# Patient Record
Sex: FEMALE | HISPANIC OR LATINO
[De-identification: names, ages, dates, MRNs, and addresses within clinical notes are randomized per-mention and may not be internally consistent; named-entity substitution may affect disease eponyms.]

---

## 2022-04-11 ENCOUNTER — APPOINTMENT (OUTPATIENT)
Dept: ENDOCRINOLOGY | Facility: CLINIC | Age: 52
End: 2022-04-11
Payer: MEDICAID

## 2022-04-11 VITALS
WEIGHT: 172 LBS | BODY MASS INDEX: 27.64 KG/M2 | HEIGHT: 66 IN | DIASTOLIC BLOOD PRESSURE: 70 MMHG | SYSTOLIC BLOOD PRESSURE: 110 MMHG | HEART RATE: 75 BPM

## 2022-04-11 PROBLEM — Z00.00 ENCOUNTER FOR PREVENTIVE HEALTH EXAMINATION: Status: ACTIVE | Noted: 2022-04-11

## 2022-04-11 PROCEDURE — 99205 OFFICE O/P NEW HI 60 MIN: CPT

## 2022-04-11 NOTE — ASSESSMENT
[Methimazole Therapy/PTU Therapy] : Risks and benefits of methimazole therapy/PTU therapy were discussed with the patient, including rash, liver dysfunction, and agranulocytosis. Patient was instructed to call the office for flu-like symptoms (e.g. fever and sore-throat) [FreeTextEntry1] : Now appears euthyroid. WE discussed all options for reestablishing euthyroid state, including TT vs. FIGUEROA. vs. long term HENNESSY use, and their respective r/b. After discussion, she is interested on definitive Rx w/ FIGUEROA. We will continue BB and stop HENNESSY at this time, and perform NM uptake exam + treatment in 2-4 weeks. Verbalized understanding and agrees with treatment plan, will contact MD and seek emergency medical care if condition changes, we reviewed the sign/symptoms or worsening thyrotoxicosis and thyroid storm. We will likely restart a lower dose of HENNESSY a week after FIGUEROA treatment while I-131 takes effect.\par \par 2) Weight gain:  complicated bypre DM2. Discussed medical  strategies. Pt would like to try lifestyle modifications and metformin therapy at this time. Reassess on the NV for at least ~5% TBW loss.\par \par 3) Essential HTN: Pt is at goal BP and on an ACE inh. Reassess microalbumin prior to the NV.\par  \par 4) Dyslipidemia: Pt is on a highintensity statin. Atorvastatin 80 mg QDaily. REassess lipids on the next visit. LDL target <100.\par \par

## 2022-04-11 NOTE — DATA REVIEWED
[FreeTextEntry1] : 12/2021 TSH <0.01 fT4 1.8 TT3 200\par US of the thyroid mildly hypervasc parenchyma, no nodules.

## 2022-04-11 NOTE — HISTORY OF PRESENT ILLNESS
[FreeTextEntry1] : 50 y/o F w/ Hx of preDM2, HTN, HLD \par here for initial evaluation and management of thyroid issues\par generally feels well and endorses no acute complaints.\par reports cc of tachycardia, fatigue and hair loss, started ~6 months ago. no precipitating factors, gradually worsened over time, some improvement after metoprolol iniitiation. reports family hx of graves (daughter, sister). She otherwise denies any f/c, CP, SOB, palpitations, tremors, depressed mood, anxiety, palpitations, n/v, stool/urinary abn, skin/weight changes, heat/cold intolerance, HAs, breast/nipple changes, polyuria/polydipsia/nocturia or other complaints.\par she again denies any dysphagia, hoarseness, neck tenderness or new palpable masses. she again denies any family history of thyroid disorders or personal exposure to ionizing radiation.\par \par

## 2022-06-20 ENCOUNTER — APPOINTMENT (OUTPATIENT)
Dept: ENDOCRINOLOGY | Facility: CLINIC | Age: 52
End: 2022-06-20
Payer: MEDICAID

## 2022-06-20 VITALS
HEIGHT: 66 IN | SYSTOLIC BLOOD PRESSURE: 120 MMHG | HEART RATE: 64 BPM | WEIGHT: 178 LBS | DIASTOLIC BLOOD PRESSURE: 70 MMHG | BODY MASS INDEX: 28.61 KG/M2

## 2022-06-20 PROCEDURE — 99215 OFFICE O/P EST HI 40 MIN: CPT

## 2022-06-20 NOTE — HISTORY OF PRESENT ILLNESS
[FreeTextEntry1] : 52 y/o F w/ Hx of preDM2, HTN, HLD \par initial evaluation and management of thyroid issues\par generally feels well and endorses no acute complaints.\par reports cc of tachycardia, fatigue and hair loss, started ~6 months ago. no precipitating factors, gradually worsened over time, some improvement after metoprolol iniitiation. reports family hx of graves (daughter, sister). \par \par 6/2022 Here for /fu, generally feels well and endorses no acute complaints. No interval events since LV. Today reports cessation of palpitations, concerned about weight gain. labs w/ TSH at 0.02, fT4 1 and TT3 wnl at 100. TSH recep ab at5 2.3, TPO Ab at 310 \par She otherwise denies any f/c, CP, SOB, palpitations, tremors, depressed mood, anxiety, palpitations, n/v, stool/urinary abn, skin/weight changes, heat/cold intolerance, HAs, breast/nipple changes, polyuria/polydipsia/nocturia or other complaints.\par she again denies any dysphagia, hoarseness, neck tenderness or new palpable masses. she again denies any family history of thyroid disorders or personal exposure to ionizing radiation.\par \par

## 2022-06-20 NOTE — ASSESSMENT
[FreeTextEntry1] : Now appears euthyroid. WE discussed all options for reestablishing euthyroid state, including TT vs. FIGUEROA. vs. long term HENNESSY use, and their respective r/b. After discussion, she is interested on definitive Rx w/ FIGUEROA. We will continue BB and stop HENNESSY at this time, and perform NM uptake exam + treatment in 2-4 weeks. Verbalized understanding and agrees with treatment plan, will contact MD and seek emergency medical care if condition changes, we reviewed the sign/symptoms or worsening thyrotoxicosis and thyroid storm. We will likely restart a lower dose of HENNESSY a week after FIGUEROA treatment while I-131 takes effect.\par \par 2) Weight gain:  complicated by pre DM2. Discussed medical  strategies. Pt would like to try lifestyle modifications and metformin therapy at this time. Reassess on the NV for at least ~5% TBW loss.\par \par 3) Essential HTN: Pt is at goal BP and on an ACE inh. Reassess microalbumin prior to the NV.\par  \par 4) Dyslipidemia: Pt is on a highintensity statin. Atorvastatin 80 mg QDaily. REassess lipids on the next visit. LDL target <100.\par \par  [Methimazole Therapy/PTU Therapy] : Risks and benefits of methimazole therapy/PTU therapy were discussed with the patient, including rash, liver dysfunction, and agranulocytosis. Patient was instructed to call the office for flu-like symptoms (e.g. fever and sore-throat)

## 2022-09-26 ENCOUNTER — APPOINTMENT (OUTPATIENT)
Dept: ENDOCRINOLOGY | Facility: CLINIC | Age: 52
End: 2022-09-26

## 2022-09-26 VITALS
HEART RATE: 80 BPM | BODY MASS INDEX: 28.77 KG/M2 | DIASTOLIC BLOOD PRESSURE: 60 MMHG | WEIGHT: 179 LBS | SYSTOLIC BLOOD PRESSURE: 100 MMHG | HEIGHT: 66 IN

## 2022-09-26 PROCEDURE — 99215 OFFICE O/P EST HI 40 MIN: CPT

## 2022-09-26 RX ORDER — METFORMIN ER 500 MG 500 MG/1
500 TABLET ORAL DAILY
Qty: 180 | Refills: 1 | Status: ACTIVE | COMMUNITY
Start: 2022-09-26 | End: 1900-01-01

## 2022-09-26 RX ORDER — METHIMAZOLE 5 MG/1
5 TABLET ORAL DAILY
Qty: 90 | Refills: 1 | Status: ACTIVE | COMMUNITY
Start: 2022-04-11 | End: 1900-01-01

## 2022-09-26 RX ORDER — ATORVASTATIN CALCIUM 80 MG/1
80 TABLET, FILM COATED ORAL
Qty: 90 | Refills: 3 | Status: ACTIVE | COMMUNITY
Start: 2022-09-26 | End: 1900-01-01

## 2022-09-26 NOTE — HISTORY OF PRESENT ILLNESS
[FreeTextEntry1] : 51 y/o F w/ Hx of preDM2, HTN, HLD \par initial evaluation and management of thyroid issues\par generally feels well and endorses no acute complaints.\par reports cc of tachycardia, fatigue and hair loss, started ~6 months ago. no precipitating factors, gradually worsened over time, some improvement after metoprolol iniitiation. reports family hx of graves (daughter, sister). \par \par 9/2022 Here for /fu, generally feels well and endorses no acute complaints. No interval events since LV. Today reports cessation of palpitations, resolved weight gain. labs w/ , fT4 1 and TT3 wnl at 100. TSH recep ab at5 2.3, TPO Ab at 310 \par She otherwise denies any f/c, CP, SOB, palpitations, tremors, depressed mood, anxiety, palpitations, n/v, stool/urinary abn, skin/weight changes, heat/cold intolerance, HAs, breast/nipple changes, polyuria/polydipsia/nocturia or other complaints.\par she again denies any dysphagia, hoarseness, neck tenderness or new palpable masses. she again denies any family history of thyroid disorders or personal exposure to ionizing radiation.\par \par

## 2022-09-26 NOTE — ASSESSMENT
[FreeTextEntry1] : Now appears euthyroid. WE discussed all options for reestablishing euthyroid state, including TT vs. FIGUEROA. vs. long term HENNESSY use, and their respective r/b. After discussion, she is interested on  HENNESSY at this time, reduce to 5 mg Mon-Fri. Verbalized understanding and agrees with treatment plan, will contact MD and seek emergency medical care if condition changes, we reviewed the sign/symptoms or worsening thyrotoxicosis and thyroid storm. \par \par 2) Weight gain:  complicated by pre DM2. Discussed medical  strategies. Pt would like to try lifestyle modifications and metformin therapy at this time. Reassess on the NV for at least ~5% TBW loss.\par \par 3) Essential HTN: Pt is at goal BP and on an ACE inh. Reassess microalbumin prior to the NV.\par  \par 4) Dyslipidemia: Pt is on a highintensity statin. Atorvastatin 80 mg QDaily. REassess lipids on the next visit. LDL target <100.\par \par  [Long Term Vascular Complications] : long term vascular complications of diabetes [Carbohydrate Consistent Diet] : carbohydrate consistent diet [Importance of Diet and Exercise] : importance of diet and exercise to improve glycemic control, achieve weight loss and improve cardiovascular health [Weight Loss] : weight loss [Methimazole Therapy/PTU Therapy] : Risks and benefits of methimazole therapy/PTU therapy were discussed with the patient, including rash, liver dysfunction, and agranulocytosis. Patient was instructed to call the office for flu-like symptoms (e.g. fever and sore-throat)

## 2023-01-23 ENCOUNTER — APPOINTMENT (OUTPATIENT)
Dept: ENDOCRINOLOGY | Facility: CLINIC | Age: 53
End: 2023-01-23
Payer: MEDICAID

## 2023-01-23 VITALS
SYSTOLIC BLOOD PRESSURE: 100 MMHG | HEIGHT: 66 IN | DIASTOLIC BLOOD PRESSURE: 60 MMHG | BODY MASS INDEX: 28.77 KG/M2 | WEIGHT: 179 LBS | HEART RATE: 70 BPM

## 2023-01-23 DIAGNOSIS — R73.03 PREDIABETES.: ICD-10-CM

## 2023-01-23 DIAGNOSIS — E05.00 THYROTOXICOSIS WITH DIFFUSE GOITER W/OUT THYROTOXIC CRISIS OR STORM: ICD-10-CM

## 2023-01-23 DIAGNOSIS — I10 ESSENTIAL (PRIMARY) HYPERTENSION: ICD-10-CM

## 2023-01-23 DIAGNOSIS — E78.5 HYPERLIPIDEMIA, UNSPECIFIED: ICD-10-CM

## 2023-01-23 PROCEDURE — 99215 OFFICE O/P EST HI 40 MIN: CPT

## 2023-01-23 NOTE — HISTORY OF PRESENT ILLNESS
[FreeTextEntry1] : 51 y/o F w/ Hx of preDM2, HTN, HLD \par initial evaluation and management of thyroid issues\par generally feels well and endorses no acute complaints.\par reports cc of tachycardia, fatigue and hair loss, started ~6 months ago. no precipitating factors, gradually worsened over time, some improvement after metoprolol iniitiation. reports family hx of graves (daughter, sister). \par \par 1/2023 Here for /fu, generally feels well and endorses no acute complaints. No interval events since LV. Today reports cessation of palpitations, resolved weight gain. labs w/ , fT4 1 and TT3 wnl at 100. TSH recep ab at5 2.3, TPO Ab at 310 \par She otherwise denies any f/c, CP, SOB, palpitations, tremors, depressed mood, anxiety, palpitations, n/v, stool/urinary abn, skin/weight changes, heat/cold intolerance, HAs, breast/nipple changes, polyuria/polydipsia/nocturia or other complaints.\par she again denies any dysphagia, hoarseness, neck tenderness or new palpable masses. she again denies any family history of thyroid disorders or personal exposure to ionizing radiation.\par \par

## 2023-01-23 NOTE — ASSESSMENT
[Long Term Vascular Complications] : long term vascular complications of diabetes [Carbohydrate Consistent Diet] : carbohydrate consistent diet [Importance of Diet and Exercise] : importance of diet and exercise to improve glycemic control, achieve weight loss and improve cardiovascular health [Weight Loss] : weight loss [Methimazole Therapy/PTU Therapy] : Risks and benefits of methimazole therapy/PTU therapy were discussed with the patient, including rash, liver dysfunction, and agranulocytosis. Patient was instructed to call the office for flu-like symptoms (e.g. fever and sore-throat) [FreeTextEntry1] : Now appears euthyroid. WE discussed all options for reestablishing euthyroid state, including TT vs. FIGUEROA. vs. long term HENNESSY use, and their respective r/b. After discussion, she is interested on  HENNESSY at this time, reduce to 5 mg Mon-Fri. Verbalized understanding and agrees with treatment plan, will contact MD and seek emergency medical care if condition changes, we reviewed the sign/symptoms or worsening thyrotoxicosis and thyroid storm. \par \par 2) Weight gain:  complicated by pre DM2. Discussed medical  strategies. Pt would like to try lifestyle modifications and metformin therapy at this time. Reassess on the NV for at least ~5% TBW loss.\par \par 3) Essential HTN: Pt is at goal BP and on an ACE inh. Reassess microalbumin prior to the NV.\par  \par 4) Dyslipidemia: Pt is on a highintensity statin. Atorvastatin 80 mg QDaily. REassess lipids on the next visit. LDL target <100.\par \par